# Patient Record
Sex: FEMALE | URBAN - METROPOLITAN AREA
[De-identification: names, ages, dates, MRNs, and addresses within clinical notes are randomized per-mention and may not be internally consistent; named-entity substitution may affect disease eponyms.]

---

## 2018-01-11 ENCOUNTER — IMPORTED ENCOUNTER (OUTPATIENT)
Dept: URBAN - METROPOLITAN AREA CLINIC 9 | Facility: CLINIC | Age: 53
End: 2018-01-11

## 2018-01-15 ENCOUNTER — IMPORTED ENCOUNTER (OUTPATIENT)
Dept: URBAN - METROPOLITAN AREA CLINIC 9 | Facility: CLINIC | Age: 53
End: 2018-01-15

## 2018-02-01 ENCOUNTER — IMPORTED ENCOUNTER (OUTPATIENT)
Dept: URBAN - METROPOLITAN AREA CLINIC 9 | Facility: CLINIC | Age: 53
End: 2018-02-01

## 2018-02-05 ENCOUNTER — IMPORTED ENCOUNTER (OUTPATIENT)
Dept: URBAN - METROPOLITAN AREA CLINIC 9 | Facility: CLINIC | Age: 53
End: 2018-02-05

## 2018-02-15 ENCOUNTER — IMPORTED ENCOUNTER (OUTPATIENT)
Dept: URBAN - METROPOLITAN AREA CLINIC 9 | Facility: CLINIC | Age: 53
End: 2018-02-15

## 2018-02-27 ENCOUNTER — IMPORTED ENCOUNTER (OUTPATIENT)
Dept: URBAN - METROPOLITAN AREA CLINIC 9 | Facility: CLINIC | Age: 53
End: 2018-02-27

## 2018-06-04 ENCOUNTER — IMPORTED ENCOUNTER (OUTPATIENT)
Dept: URBAN - METROPOLITAN AREA CLINIC 9 | Facility: CLINIC | Age: 53
End: 2018-06-04

## 2018-06-20 ENCOUNTER — IMPORTED ENCOUNTER (OUTPATIENT)
Dept: URBAN - METROPOLITAN AREA CLINIC 9 | Facility: CLINIC | Age: 53
End: 2018-06-20

## 2018-06-26 ENCOUNTER — IMPORTED ENCOUNTER (OUTPATIENT)
Dept: URBAN - METROPOLITAN AREA CLINIC 9 | Facility: CLINIC | Age: 53
End: 2018-06-26

## 2018-07-27 ENCOUNTER — IMPORTED ENCOUNTER (OUTPATIENT)
Dept: URBAN - METROPOLITAN AREA CLINIC 9 | Facility: CLINIC | Age: 53
End: 2018-07-27

## 2018-08-16 ENCOUNTER — IMPORTED ENCOUNTER (OUTPATIENT)
Dept: URBAN - METROPOLITAN AREA CLINIC 9 | Facility: CLINIC | Age: 53
End: 2018-08-16

## 2018-11-28 ENCOUNTER — IMPORTED ENCOUNTER (OUTPATIENT)
Dept: URBAN - METROPOLITAN AREA CLINIC 9 | Facility: CLINIC | Age: 53
End: 2018-11-28

## 2020-09-21 NOTE — PATIENT DISCUSSION
9/21/20 HAS MORE DIFFICULTY , STOPPED DRIVING, DEPTH PERCEPTION AFFCTED, HOWEVER NO WORSENING ON IMAGING OD. TO GET NEW RX.

## 2020-10-20 NOTE — PATIENT DISCUSSION
1.  Pseudophakia OU - IOLs stable. open capsule garrison. Monitor for changes in vision. 2. ARMD OS dry -  Sees DR Wick every 6 weeks. Pt Importance of smoking cessation blood pressure control and healthy diet were emphasized. In accordance with the AREDS study a good multivitamin containing EC and Zinc were recommened to be taken daily. Patient was instructed to self monitor their monocular vision (reading/Amsler Grid) at least weekly. Patient should immediately report any new onset of decreased vision or metamorphopsia. 3. ARMD OD dry - Importance of smoking cessation blood pressure control and healthy diet were emphasized. In accordance with the AREDS study a good multivitamin containing EC and Zinc were recommened to be taken daily. Patient was instructed to self monitor their monocular vision (reading/Amsler Grid) at least weekly. Patient should immediately report any new onset of decreased vision or metamorphopsia. 4. Dry Eyes OU:  Start artificials tears. Encouraged regular use. 5.  Rx change--OD only--  OS has hamida peripheral vision. MOre dist6. RTN 1 yr CE  -or prn.   Referred by Dr Dyllan Beckwith--

## 2021-02-03 ENCOUNTER — IMPORTED ENCOUNTER (OUTPATIENT)
Dept: URBAN - METROPOLITAN AREA CLINIC 9 | Facility: CLINIC | Age: 56
End: 2021-02-03

## 2021-02-17 ENCOUNTER — IMPORTED ENCOUNTER (OUTPATIENT)
Dept: URBAN - METROPOLITAN AREA CLINIC 9 | Facility: CLINIC | Age: 56
End: 2021-02-17

## 2021-06-21 NOTE — PATIENT DISCUSSION
1.  Pseudophakia OU - IOLs stable. open capsule garrison. Monitor for changes in vision. 2. ARMD OU dry -  Sees DR Wick every 6 weeks. OCT MAC 6/21/21   Pt Importance of smoking cessation blood pressure control and healthy diet were emphasized. In accordance with the AREDS study a good multivitamin containing EC and Zinc were recommened to be taken daily. Patient was instructed to self monitor their monocular vision (reading/Amsler Grid) at least weekly. Patient should immediately report any new onset of decreased vision or metamorphopsia. 3. Dry Eyes OU:  Start artificials tears. Encouraged regular use. 4.  Rx change--OD only--  OS has hamida peripheral vision. MOre dist5. RTN 1 yr CE/OCT MAC  -or prn. Referred by Dr Ladan Beach--.

## 2021-06-21 NOTE — PATIENT DISCUSSION
1.  Pseudophakia OU - IOLs stable. open capsule garrison. Monitor for changes in vision. 2. ARMD OU dry -  Sees DR Wick every 6 weeks. OCT MAC 6/21/21   Pt Importance of smoking cessation blood pressure control and healthy diet were emphasized. In accordance with the AREDS study a good multivitamin containing EC and Zinc were recommened to be taken daily. Patient was instructed to self monitor their monocular vision (reading/Amsler Grid) at least weekly. Patient should immediately report any new onset of decreased vision or metamorphopsia. 3. Dry Eyes OU:  Start artificials tears. Encouraged regular use. 4.  Rx change--OD only--  OS has hamida peripheral vision. MOre dist5. RTN 1 yr CE/OCT MAC  -or prn.   Referred by Dr Esteban Cano--

## 2021-10-16 ASSESSMENT — VISUAL ACUITY
OS_SC: 20/40 - SN
OD_SC: 20/60 + SN
OS_SC: 20/200 SN
OS_SC: 20/50 -2 SN
OD_SC: 20/50 SN
OD_CC: 20/25 SN
OS_CC: 20/30 SN
OD_SC: 20/50 SN
OS_SC: 20/40 - SN
OD_SC: 20/200 SN
OD_CC: 20/25 SN
OS_SC: 20/200 SN
OD_SC: 20/50 -2 SN
OS_SC: 20/70 SN
OD_SC: 20/60 SN
OS_CC: 20/25 SN
OD_SC: 20/200 SN
OD_SC: 20/40 SN
OS_SC: 20/40 SN
OD_SC: 20/50 -2 SN
OD_CC: 20/25 - SN
OD_CC: 20/40 SN
OS_SC: 20/40 - SN
OS_CC: 20/40 SN
OS_SC: 20/50 -2 SN
OS_SC: 20/70 SN
OS_CC: 20/40 SN
OD_CC: 20/30 SN
OS_CC: 20/20 -2 SN
OD_CC: 20/40 SN

## 2021-10-16 ASSESSMENT — KERATOMETRY
OS_K1POWER_DIOPTERS: 45.5
OD_K1POWER_DIOPTERS: 45
OD_AXISANGLE2_DEGREES: 135
OD_AXISANGLE_DEGREES: 55
OS_K2POWER_DIOPTERS: 45.75
OS_AXISANGLE2_DEGREES: 114
OS_AXISANGLE_DEGREES: 24
OS_K2POWER_DIOPTERS: 46.5
OD_AXISANGLE_DEGREES: 45
OS_AXISANGLE2_DEGREES: 132
OD_K2POWER_DIOPTERS: 46
OS_K1POWER_DIOPTERS: 44.5
OS_AXISANGLE_DEGREES: 42
OS_AXISANGLE_DEGREES: 64
OS_K1POWER_DIOPTERS: 45.5
OD_K2POWER_DIOPTERS: 45.75
OD_AXISANGLE2_DEGREES: 145
OS_K2POWER_DIOPTERS: 46.25
OD_K1POWER_DIOPTERS: 45.25
OS_AXISANGLE2_DEGREES: 154

## 2021-10-16 ASSESSMENT — TONOMETRY
OD_IOP_MMHG: 16
OD_IOP_MMHG: 18
OD_IOP_MMHG: 15
OD_IOP_MMHG: 15
OD_IOP_MMHG: 17
OD_IOP_MMHG: 12
OS_IOP_MMHG: 17
OD_IOP_MMHG: 20
OS_IOP_MMHG: 16
OS_IOP_MMHG: 18
OD_IOP_MMHG: 20
OS_IOP_MMHG: 17
OS_IOP_MMHG: 18
OS_IOP_MMHG: 18
OS_IOP_MMHG: 17
OD_IOP_MMHG: 12
OS_IOP_MMHG: 12
OS_IOP_MMHG: 12
OS_IOP_MMHG: 22
OS_IOP_MMHG: 16